# Patient Record
(demographics unavailable — no encounter records)

---

## 2025-01-28 NOTE — HISTORY OF PRESENT ILLNESS
[de-identified] : 65yM pt who presents after they felt a back strain during a twisting injury, referred from ER.  Pt notes extending sitting and ascending/descending stairs worsens the pain and symptoms.  Pt has tried activity modification and NSAIDs and dose pack and muscle relaxant with minimal relief, pain level remains a 6/10.  Also notes increased stiffness/worse motion in the back.  Has not trialed physical therapy or injections.  Pt denies numbness, paresthesias, f/c.  No BBI or saddle anesthesia, no clumsiness or change in motor function.  No hx of spinal surgery. Pt notes the pain interferes with activities of daily life and that overall mobility has been decreased.  They wish to discuss possible treatment options and return to baseline activity and mobility before symptomatic onset.

## 2025-01-28 NOTE — DISCUSSION/SUMMARY
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  The patient was extensively counseled on treatment options including but not limited to observation, rest/activity modification, bracing, anti-inflammatory medications, physical therapy, injections, and surgery.  The natural history of the disease was thoroughly explained.  We discussed that the majority of the time, this condition can be initially treated conservatively. The patient will proceed with: -PT -gabapentin rx, continue medrol dose pack and nsaid -pt was instructed on the importance of resting, icing and elevating to minimize swelling -RTC 6w - consider mri   I have personally obtained the history, reviewed the ROS as noted, and performed the physical examination today.  The patient and I discussed the assessment and options and developed the plan.  All questions were answered and the patient stated their understanding of the treatment plan and appreciation of the visit.   My cumulative time spent on this patient's visit included: Preparation for the visit, review of the medical records, review of pertinent diagnostic studies, examination and counseling of the patient on the above diagnosis, treatment plan and prognosis, orders of diagnostic tests, medications and/or appropriate procedures and documentation in the medical records of today's visit.   Jose Urban MD

## 2025-01-28 NOTE — PHYSICAL EXAM
[de-identified] : Gen: NAD Resp: Nonlabored respirations Gait: Nl Head: CN I - XII grossly intact   Spine: Skin in tact, tender paraspinal region Full neck ROM UE: 5/5 D B T WE WF IO b/l SILT C4-T1 b/l (-)Nesha Cameron No hand atrophy 2+ rad bcr   LE: 5/5 IP Q HS EHL TA GS SILT L3-S1 b/l (-) clonus, (-) babinski (-) slr, c/l slr 2+ dp pt wwp bcr    [de-identified] : The following radiographs were ordered and read by me during this patient's visit. I reviewed each radiograph in detail with the patient and discussed the findings as highlighted below.   4 views of the lumbar spine were obtained today that show no fracture, or dislocation. There are no degenerative changes seen. There is grade 1 l4-l5 slip. Preserved lordosis.  No obvious osseous abnormality. Otherwise unremarkable.